# Patient Record
Sex: FEMALE | Race: WHITE
[De-identification: names, ages, dates, MRNs, and addresses within clinical notes are randomized per-mention and may not be internally consistent; named-entity substitution may affect disease eponyms.]

---

## 2019-06-28 ENCOUNTER — HOSPITAL ENCOUNTER (EMERGENCY)
Dept: HOSPITAL 11 - JP.ED | Age: 24
Discharge: HOME | End: 2019-06-28
Payer: COMMERCIAL
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
## 2019-06-28 DIAGNOSIS — Z88.8: ICD-10-CM

## 2019-06-28 DIAGNOSIS — S41.101A: Primary | ICD-10-CM

## 2019-06-28 DIAGNOSIS — Z88.5: ICD-10-CM

## 2019-06-28 DIAGNOSIS — Z79.899: ICD-10-CM

## 2019-06-28 DIAGNOSIS — L03.111: ICD-10-CM

## 2019-06-28 NOTE — EDM.PDOC
ED HPI GENERAL MEDICAL PROBLEM





- General


Chief Complaint: Skin Complaint


Stated Complaint: PAINFUL RASH


Time Seen by Provider: 06/28/19 15:00


Source of Information: Reports: Patient, Family


History Limitations: Reports: No Limitations





- History of Present Illness


INITIAL COMMENTS - FREE TEXT/NARRATIVE: 


Tiffanie is a 23 year old female, presents to the ED today with c/o increasing 

wound to right arm pit.  Started a week ago after she knicked herself with a 

razor, got worse the last two days, no fever/chills/drainage. Has been using Mom

's Clobetasol for two days with no relief. 


Onset: Gradual





- Related Data


 Allergies











Allergy/AdvReac Type Severity Reaction Status Date / Time


 


bupropion [From Wellbutrin] Allergy Severe Respiratory Verified 06/28/19 15:14





   Distress  


 


levetiracetam [From Keppra] Allergy Severe Seizure Verified 06/28/19 15:14


 


topiramate [From Topamax] Allergy Severe Depression Verified 06/28/19 15:15


 


tramadol Allergy Severe Respiratory Verified 06/28/19 15:13





   Distress  


 


hydromorphone [From Dilaudid] Allergy Intermediate Rash Verified 06/28/19 15:16


 


Sulfa (Sulfonamide Allergy Intermediate Rash Verified 06/28/19 15:13





Antibiotics)     











Home Meds: 


 Home Meds





Citalopram [Citalopram HBr] 20 mg PO DAILY 06/28/19 [History]


Norgestimate-Ethinyl Estradiol [Ortho Tri-Cyclen 28 Tablet] 1 tab PO DAILY 06/28 /19 [History]


lamoTRIgine [Lamictal] 400 mg PO DAILY 06/28/19 [History]











ED ROS GENERAL





- Review of Systems


Review Of Systems: ROS reveals no pertinent complaints other than HPI.





ED EXAM, SKIN/RASH


Exam: See Below


Exam Limited By: No Limitations


General Appearance: Alert, WD/WN, No Apparent Distress


Respiratory/Chest: No Respiratory Distress


Cardiovascular: Regular Rate, Rhythm


Extremities: Normal Inspection


Neurological: Alert, Oriented, CN II-XII Intact


Skin: Warm, Dry, Other (4 cm area of erythema with ? fungal component, no 

streaking, warm to touch, top layer of skin has peeled off to this area)





Course





- Vital Signs


Last Recorded V/S: 


 Last Vital Signs











Temp  35.6 C   06/28/19 14:41


 


Pulse  74   06/28/19 14:41


 


Resp  12   06/28/19 14:41


 


BP  115/69   06/28/19 14:41


 


Pulse Ox  97   06/28/19 14:41








Tiffanie is a 23 year old female, presents with wound to right armpit, please 

refer to HPI and focused exam.  Patient's wound appears to have fungal and 

cellulitic component, no signs of sepsis, no abscess formation, no 

lymphadenopathy.  Will start patient on Keflex and Lotrisone cream and have her 

follow up wit PCP in [place] next week, patient told to avoid soaps, lotions, 

deodorants and shaving until healed.  Reasons to return discussed, patient 

agreeable and discharged in stable condition. 





Departure





- Departure


Time of Disposition: 16:00


Disposition: Home, Self-Care 01


Condition: Good


Clinical Impression: 


 Wound of axillary region, Cellulitis of axilla, right








- Discharge Information


Instructions:  Cellulitis, Adult


Referrals: 


PCP,None [Primary Care Provider] - 


Forms:  ED Department Discharge


Additional Instructions: 


Start antibiotics today as well as Lotrisone cream.


Avoid any use of deodorant, lotions, soaps until healed.


Follow up with primary care when you return home. 


I would use some 4x4 gauze tucked into armpit to keep cream where it should be 

and avoid excess moisture.
02-May-2017 15:47